# Patient Record
Sex: MALE | Race: WHITE | URBAN - METROPOLITAN AREA
[De-identification: names, ages, dates, MRNs, and addresses within clinical notes are randomized per-mention and may not be internally consistent; named-entity substitution may affect disease eponyms.]

---

## 2019-10-04 ENCOUNTER — APPOINTMENT (RX ONLY)
Dept: URBAN - METROPOLITAN AREA CLINIC 168 | Facility: CLINIC | Age: 54
Setting detail: DERMATOLOGY
End: 2019-10-04

## 2019-10-04 DIAGNOSIS — L40.0 PSORIASIS VULGARIS: ICD-10-CM

## 2019-10-04 PROCEDURE — ? ADDITIONAL NOTES

## 2019-10-04 PROCEDURE — ? PRESCRIPTION

## 2019-10-04 PROCEDURE — ? COUNSELING

## 2019-10-04 PROCEDURE — 99213 OFFICE O/P EST LOW 20 MIN: CPT

## 2019-10-04 RX ORDER — BETAMETHASONE DIPROPIONATE 0.5 MG/G
CREAM TOPICAL
Qty: 1 | Refills: 3 | Status: ERX | COMMUNITY
Start: 2019-10-04

## 2019-10-04 RX ORDER — TRIAMCINOLONE ACETONIDE 1 MG/G
CREAM TOPICAL
Qty: 1 | Refills: 2 | Status: ERX | COMMUNITY
Start: 2019-10-04

## 2019-10-04 RX ADMIN — BETAMETHASONE DIPROPIONATE: 0.5 CREAM TOPICAL at 00:00

## 2019-10-04 RX ADMIN — TRIAMCINOLONE ACETONIDE: 1 CREAM TOPICAL at 00:00

## 2019-10-04 ASSESSMENT — LOCATION SIMPLE DESCRIPTION DERM
LOCATION SIMPLE: LEFT INDEX FINGER
LOCATION SIMPLE: RIGHT POSTERIOR THIGH
LOCATION SIMPLE: RIGHT INDEX FINGER
LOCATION SIMPLE: RIGHT LOWER BACK
LOCATION SIMPLE: LEFT POSTERIOR THIGH

## 2019-10-04 ASSESSMENT — LOCATION ZONE DERM
LOCATION ZONE: FINGERNAIL
LOCATION ZONE: TRUNK
LOCATION ZONE: LEG

## 2019-10-04 ASSESSMENT — LOCATION DETAILED DESCRIPTION DERM
LOCATION DETAILED: RIGHT INDEX FINGERNAIL
LOCATION DETAILED: RIGHT DISTAL POSTERIOR THIGH
LOCATION DETAILED: LEFT INDEX FINGERNAIL
LOCATION DETAILED: RIGHT INFERIOR MEDIAL MIDBACK
LOCATION DETAILED: LEFT PROXIMAL POSTERIOR THIGH

## 2019-10-04 NOTE — HPI: RASH (PSORIASIS)
How Severe Is Your Psoriasis?: moderate
Is This A New Presentation, Or A Follow-Up?: Follow Up Psoriasis
Additional History: Pt adds that he has been spending time in [place] which is significantly less stressful and may have contributed to his  improvement

## 2019-10-04 NOTE — PROCEDURE: ADDITIONAL NOTES
Detail Level: Simple
Additional Notes: ***\\n-well controlled on topical treatments \\n-nails involved \\n-Rx refill for triamcinolone ointment sent today \\n-Rx refill for betamethasone ointment \\n\\nPatient’s daughter was used as an  over the phone

## 2019-10-07 ENCOUNTER — RX ONLY (OUTPATIENT)
Age: 54
Setting detail: RX ONLY
End: 2019-10-07

## 2019-10-07 RX ORDER — FLUOCINONIDE 0.5 MG/G
CREAM TOPICAL
Qty: 60 | Refills: 2 | Status: ERX | COMMUNITY
Start: 2019-10-07

## 2019-10-25 RX ORDER — TRIAMCINOLONE ACETONIDE 1 MG/G
CREAM TOPICAL
Qty: 1 | Refills: 2 | Status: ERX

## 2020-06-10 ENCOUNTER — APPOINTMENT (RX ONLY)
Dept: URBAN - METROPOLITAN AREA CLINIC 168 | Facility: CLINIC | Age: 55
Setting detail: DERMATOLOGY
End: 2020-06-10

## 2020-06-10 DIAGNOSIS — L40.0 PSORIASIS VULGARIS: ICD-10-CM

## 2020-06-10 PROCEDURE — ? PRESCRIPTION

## 2020-06-10 PROCEDURE — ? COUNSELING

## 2020-06-10 PROCEDURE — ? ADDITIONAL NOTES

## 2020-06-10 PROCEDURE — 99212 OFFICE O/P EST SF 10 MIN: CPT | Mod: 95

## 2020-06-10 RX ORDER — TRIAMCINOLONE ACETONIDE 1 MG/G
1 CREAM TOPICAL BID
Qty: 1 | Refills: 3 | Status: ERX

## 2020-06-10 RX ORDER — BETAMETHASONE DIPROPIONATE 0.5 MG/G
1 CREAM TOPICAL BID
Qty: 2 | Refills: 2 | Status: ERX | COMMUNITY
Start: 2020-06-10

## 2020-06-10 RX ADMIN — BETAMETHASONE DIPROPIONATE 1: 0.5 CREAM TOPICAL at 00:00

## 2020-06-10 ASSESSMENT — LOCATION SIMPLE DESCRIPTION DERM
LOCATION SIMPLE: LEFT INDEX FINGER
LOCATION SIMPLE: RIGHT LOWER BACK
LOCATION SIMPLE: RIGHT INDEX FINGER
LOCATION SIMPLE: RIGHT UPPER BACK

## 2020-06-10 ASSESSMENT — LOCATION DETAILED DESCRIPTION DERM
LOCATION DETAILED: RIGHT INFERIOR MEDIAL MIDBACK
LOCATION DETAILED: LEFT INDEX FINGERNAIL
LOCATION DETAILED: RIGHT INDEX FINGERNAIL
LOCATION DETAILED: RIGHT SUPERIOR UPPER BACK

## 2020-06-10 ASSESSMENT — LOCATION ZONE DERM
LOCATION ZONE: TRUNK
LOCATION ZONE: TRUNK
LOCATION ZONE: FINGERNAIL

## 2020-06-10 NOTE — PROCEDURE: ADDITIONAL NOTES
Detail Level: Simple
Additional Notes: *** patient was informed that if the psoriasis covers an area of the body more than 5 palms to call and schedule a visit for a reassessment. \\n\\n-well controlled on topical treatments \\n-nails involved \\n-Rx refill for triamcinolone ointment sent today \\n-Rx refill for betamethasone ointment \\n\\nPemily’s Son was used as an  over the phone

## 2020-06-16 ENCOUNTER — RX ONLY (OUTPATIENT)
Age: 55
Setting detail: RX ONLY
End: 2020-06-16

## 2020-06-16 RX ORDER — CLOBETASOL PROPIONATE 0.5 MG/G
CREAM TOPICAL
Qty: 1 | Refills: 2 | Status: ERX | COMMUNITY
Start: 2020-06-16

## 2021-03-08 ENCOUNTER — RX ONLY (OUTPATIENT)
Age: 56
Setting detail: RX ONLY
End: 2021-03-08

## 2021-03-08 RX ORDER — CLOBETASOL PROPIONATE 0.5 MG/G
CREAM TOPICAL
Qty: 1 | Refills: 3 | Status: ERX | COMMUNITY
Start: 2021-03-08

## 2021-04-16 ENCOUNTER — APPOINTMENT (RX ONLY)
Dept: URBAN - METROPOLITAN AREA CLINIC 168 | Facility: CLINIC | Age: 56
Setting detail: DERMATOLOGY
End: 2021-04-16

## 2021-04-16 VITALS — TEMPERATURE: 97.8 F

## 2021-04-16 DIAGNOSIS — L40.0 PSORIASIS VULGARIS: ICD-10-CM | Status: IMPROVED

## 2021-04-16 PROCEDURE — 99213 OFFICE O/P EST LOW 20 MIN: CPT

## 2021-04-16 PROCEDURE — ? PRESCRIPTION

## 2021-04-16 PROCEDURE — ? ADDITIONAL NOTES

## 2021-04-16 PROCEDURE — ? COUNSELING

## 2021-04-16 RX ORDER — TRIAMCINOLONE ACETONIDE 1 MG/G
CREAM TOPICAL BID
Qty: 1 | Refills: 0 | Status: ERX

## 2021-04-16 RX ORDER — FLUOCINONIDE 0.5 MG/ML
SOLUTION TOPICAL AS DIRECTED
Qty: 1 | Refills: 0 | Status: ERX | COMMUNITY
Start: 2021-04-16

## 2021-04-16 RX ADMIN — FLUOCINONIDE: 0.5 SOLUTION TOPICAL at 00:00

## 2021-04-16 ASSESSMENT — LOCATION ZONE DERM
LOCATION ZONE: TRUNK
LOCATION ZONE: TRUNK
LOCATION ZONE: FINGERNAIL
LOCATION ZONE: SCALP

## 2021-04-16 ASSESSMENT — LOCATION SIMPLE DESCRIPTION DERM
LOCATION SIMPLE: LEFT INDEX FINGER
LOCATION SIMPLE: RIGHT LOWER BACK
LOCATION SIMPLE: RIGHT INDEX FINGER
LOCATION SIMPLE: POSTERIOR SCALP
LOCATION SIMPLE: RIGHT UPPER BACK

## 2021-04-16 ASSESSMENT — LOCATION DETAILED DESCRIPTION DERM
LOCATION DETAILED: RIGHT OCCIPITAL SCALP
LOCATION DETAILED: RIGHT SUPERIOR UPPER BACK
LOCATION DETAILED: RIGHT INDEX FINGERNAIL
LOCATION DETAILED: RIGHT INFERIOR MEDIAL MIDBACK
LOCATION DETAILED: LEFT INDEX FINGERNAIL

## 2021-04-16 ASSESSMENT — PGA PSORIASIS: PGA PSORIASIS 2020: MILD

## 2021-04-16 NOTE — PROCEDURE: ADDITIONAL NOTES
Detail Level: Simple
Additional Notes: **Improved\\n\\n- Instructions for topical corticosteroid use explained to patient and his daughter again today.\\n- Declined refill for Clobetsol for now as his psoriasis has significantly improved.\\n- Refill for Triamcinolone jar sent to his pharmacy of choice.\\n- Advised to apply to psoriasis lesions twice daily for one week, hold one week, repeat if needed.\\n- Rx Fluocinonide solution sent to apply to scalp 1-2 times daily as needed.\\n- Both Rxs sent with no refills, but patient may call office (or pharmacy request) when refills are needed. This is intended to minimize overuse and monitor how much medication he is utilizing. Daughter was in agreement and will monitor closely as well.\\n\\n-Well controlled on topical treatments \\n-Nails improving, will continue to monitor\\n\\nPatient’s Daughter was used as an  over the phone\\n\\nReturn in 6 months for follow up, sooner if needed.

## 2021-04-16 NOTE — HPI: OTHER
Condition:: Psoriasis follow up
Please Describe Your Condition:: Improved. Continues to apply Triamcinolone daily.

## 2021-10-08 ENCOUNTER — RX ONLY (OUTPATIENT)
Age: 56
Setting detail: RX ONLY
End: 2021-10-08

## 2021-10-08 ENCOUNTER — APPOINTMENT (RX ONLY)
Dept: URBAN - METROPOLITAN AREA CLINIC 168 | Facility: CLINIC | Age: 56
Setting detail: DERMATOLOGY
End: 2021-10-08

## 2021-10-08 DIAGNOSIS — L40.0 PSORIASIS VULGARIS: ICD-10-CM

## 2021-10-08 PROCEDURE — 99213 OFFICE O/P EST LOW 20 MIN: CPT

## 2021-10-08 PROCEDURE — ? PRESCRIPTION

## 2021-10-08 PROCEDURE — ? ADDITIONAL NOTES

## 2021-10-08 PROCEDURE — ? COUNSELING

## 2021-10-08 RX ORDER — FLUOCINONIDE 0.5 MG/ML
SOLUTION TOPICAL
Qty: 60 | Refills: 5 | Status: CANCELLED | COMMUNITY
Start: 2021-10-08

## 2021-10-08 RX ORDER — FLUOCINONIDE 0.5 MG/ML
SOLUTION TOPICAL
Qty: 60 | Refills: 2 | Status: ERX | COMMUNITY
Start: 2021-10-08

## 2021-10-08 RX ORDER — CLOBETASOL PROPIONATE 0.5 MG/G
CREAM TOPICAL
Qty: 60 | Refills: 5 | Status: CANCELLED | COMMUNITY
Start: 2021-10-08

## 2021-10-08 RX ORDER — CLOBETASOL PROPIONATE 0.5 MG/G
CREAM TOPICAL
Qty: 60 | Refills: 2 | Status: ERX | COMMUNITY
Start: 2021-10-08

## 2021-10-08 RX ADMIN — CLOBETASOL PROPIONATE: 0.5 CREAM TOPICAL at 00:00

## 2021-10-08 RX ADMIN — FLUOCINONIDE: 0.5 SOLUTION TOPICAL at 00:00

## 2021-10-08 ASSESSMENT — LOCATION SIMPLE DESCRIPTION DERM
LOCATION SIMPLE: RIGHT INDEX FINGER
LOCATION SIMPLE: RIGHT LOWER BACK
LOCATION SIMPLE: LEFT INDEX FINGER
LOCATION SIMPLE: POSTERIOR SCALP

## 2021-10-08 ASSESSMENT — LOCATION ZONE DERM
LOCATION ZONE: SCALP
LOCATION ZONE: FINGERNAIL
LOCATION ZONE: TRUNK

## 2021-10-08 ASSESSMENT — LOCATION DETAILED DESCRIPTION DERM
LOCATION DETAILED: RIGHT OCCIPITAL SCALP
LOCATION DETAILED: RIGHT INDEX FINGERNAIL
LOCATION DETAILED: RIGHT INFERIOR MEDIAL MIDBACK
LOCATION DETAILED: LEFT INDEX FINGERNAIL

## 2021-10-08 NOTE — PROCEDURE: MIPS QUALITY
Quality 226: Preventive Care And Screening: Tobacco Use: Screening And Cessation Intervention: Patient screened for tobacco use and is an ex/non-smoker
Quality 130: Documentation Of Current Medications In The Medical Record: Current Medications Documented
Detail Level: Detailed
Quality 110: Preventive Care And Screening: Influenza Immunization: Influenza Immunization not Administered because Patient Refused.
Quality 431: Preventive Care And Screening: Unhealthy Alcohol Use - Screening: Patient not identified as an unhealthy alcohol user when screened for unhealthy alcohol use using a systematic screening method

## 2021-10-08 NOTE — PROCEDURE: ADDITIONAL NOTES
Detail Level: Simple
Additional Notes: **Improved\\n-Well controlled on topical treatments \\n-Nails improving, will continue to monitor\\n\\nPatient’s son was used as an  over the phone.\\n\\nReturn in 6 months for follow up, sooner if needed.

## 2022-08-11 ENCOUNTER — APPOINTMENT (RX ONLY)
Dept: URBAN - METROPOLITAN AREA CLINIC 168 | Facility: CLINIC | Age: 57
Setting detail: DERMATOLOGY
End: 2022-08-11

## 2022-08-11 DIAGNOSIS — L40.0 PSORIASIS VULGARIS: ICD-10-CM

## 2022-08-11 PROCEDURE — ? ORDER TESTS

## 2022-08-11 PROCEDURE — ? PRESCRIPTION

## 2022-08-11 PROCEDURE — ? ADDITIONAL NOTES

## 2022-08-11 PROCEDURE — ? COUNSELING

## 2022-08-11 PROCEDURE — 99214 OFFICE O/P EST MOD 30 MIN: CPT

## 2022-08-11 ASSESSMENT — LOCATION SIMPLE DESCRIPTION DERM
LOCATION SIMPLE: RIGHT INDEX FINGER
LOCATION SIMPLE: POSTERIOR SCALP
LOCATION SIMPLE: RIGHT LOWER BACK
LOCATION SIMPLE: LEFT INDEX FINGER

## 2022-08-11 ASSESSMENT — LOCATION DETAILED DESCRIPTION DERM
LOCATION DETAILED: RIGHT INFERIOR MEDIAL MIDBACK
LOCATION DETAILED: RIGHT INDEX FINGERNAIL
LOCATION DETAILED: RIGHT OCCIPITAL SCALP
LOCATION DETAILED: LEFT INDEX FINGERNAIL

## 2022-08-11 ASSESSMENT — LOCATION ZONE DERM
LOCATION ZONE: TRUNK
LOCATION ZONE: FINGERNAIL
LOCATION ZONE: SCALP

## 2022-08-11 NOTE — PROCEDURE: ORDER TESTS
Lab Facility: [id number]
Performing Laboratory: [id number]
Bill For Surgical Tray: no
Expected Date Of Service: 08/11/2022
Billing Type: Third-Party Bill

## 2022-08-11 NOTE — HPI: OTHER
Condition:: Psoriasis f/u
Please Describe Your Condition:: is an established patient who is being seen for a chief complaint of Psoriasis f/u . \\n\\nLOV 10/8/2021: \\n\\n***Improved***\\n-Well controlled on topical treatments\\n-Nails improving, will continue to monitor\\nPatient’s son was used as an  over the phone.\\nReturn in 6 months for follow up, sooner if needed.

## 2022-08-11 NOTE — PROCEDURE: ADDITIONAL NOTES
Detail Level: Simple
Additional Notes: ***Stable*** \\n***Patient’s son was used as an  over the phone***\\n\\n- Patient has been doing okay on topicals, only uses clobetasol \\n- Active areas include: hands, feet, scalp, knees, elbow and nails \\n- Reports no joint pain \\n- He has tried and failed bethmethasone and fluocinonide \\n- As of right now, he is only using clobetasol as it is the only medication that is working for patient. \\n- He uses about 60 grams of clobetasol every month which is a substantial amount of steroid, advise to start lessening application. \\n- Suggested to use Zyrtec and Benadryl for itch \\n- Will send new clobetasol rx for the interim until f/u in 1 month to further discuss treatment options\\n- Lab order form given to complete if biological medication is desired during next visit

## 2022-08-12 RX ORDER — CLOBETASOL PROPIONATE 0.5 MG/G
CREAM TOPICAL
Qty: 45 | Refills: 3 | Status: ERX

## 2024-02-05 ENCOUNTER — APPOINTMENT (RX ONLY)
Dept: URBAN - METROPOLITAN AREA CLINIC 168 | Facility: CLINIC | Age: 59
Setting detail: DERMATOLOGY
End: 2024-02-05

## 2024-02-05 DIAGNOSIS — L30.9 DERMATITIS, UNSPECIFIED: ICD-10-CM

## 2024-02-05 DIAGNOSIS — L40.0 PSORIASIS VULGARIS: ICD-10-CM

## 2024-02-05 DIAGNOSIS — L82.1 OTHER SEBORRHEIC KERATOSIS: ICD-10-CM

## 2024-02-05 PROCEDURE — ? COUNSELING

## 2024-02-05 PROCEDURE — ? KOH PREP

## 2024-02-05 PROCEDURE — ? ADDITIONAL NOTES

## 2024-02-05 PROCEDURE — 99213 OFFICE O/P EST LOW 20 MIN: CPT

## 2024-02-05 PROCEDURE — ? PRESCRIPTION

## 2024-02-05 RX ORDER — CLOBETASOL PROPIONATE 0.5 MG/G
CREAM TOPICAL
Qty: 45 | Refills: 3 | Status: ERX

## 2024-02-05 ASSESSMENT — LOCATION DETAILED DESCRIPTION DERM
LOCATION DETAILED: 4TH WEBSPACE LEFT FOOT
LOCATION DETAILED: RIGHT OCCIPITAL SCALP
LOCATION DETAILED: LEFT CENTRAL TEMPLE
LOCATION DETAILED: RIGHT BUTTOCK
LOCATION DETAILED: RIGHT PROXIMAL LATERAL POSTERIOR THIGH
LOCATION DETAILED: LEFT DORSAL FOOT

## 2024-02-05 ASSESSMENT — LOCATION SIMPLE DESCRIPTION DERM
LOCATION SIMPLE: RIGHT BUTTOCK
LOCATION SIMPLE: LEFT TEMPLE
LOCATION SIMPLE: RIGHT POSTERIOR THIGH
LOCATION SIMPLE: LEFT FOOT
LOCATION SIMPLE: POSTERIOR SCALP

## 2024-02-05 ASSESSMENT — LOCATION ZONE DERM
LOCATION ZONE: FEET
LOCATION ZONE: SCALP
LOCATION ZONE: TRUNK
LOCATION ZONE: FACE
LOCATION ZONE: LEG

## 2024-02-05 NOTE — PROCEDURE: ADDITIONAL NOTES
Detail Level: Simple
Additional Notes: ***Well Controll*** \\n***Patient’s daughter, Jeanine, was used as an  over the phone***\\n\\n-Less area of disease involvement today \\n-Focal patches on right buttock, right posterior thigh, feet \\n\\n- He has tried and failed bethmethasone and fluocinonide\\n\\n-Patient may continue with Clobetasol 2-3x weekly as needed for flare up.

## 2024-09-12 ENCOUNTER — APPOINTMENT (RX ONLY)
Dept: URBAN - METROPOLITAN AREA CLINIC 168 | Facility: CLINIC | Age: 59
Setting detail: DERMATOLOGY
End: 2024-09-12

## 2024-09-12 DIAGNOSIS — L21.8 OTHER SEBORRHEIC DERMATITIS: ICD-10-CM

## 2024-09-12 DIAGNOSIS — B35.3 TINEA PEDIS: ICD-10-CM

## 2024-09-12 DIAGNOSIS — L30.0 NUMMULAR DERMATITIS: ICD-10-CM

## 2024-09-12 PROCEDURE — ? COUNSELING

## 2024-09-12 PROCEDURE — ? ADDITIONAL NOTES

## 2024-09-12 PROCEDURE — ? TREATMENT REGIMEN

## 2024-09-12 PROCEDURE — ? PRESCRIPTION

## 2024-09-12 PROCEDURE — 99214 OFFICE O/P EST MOD 30 MIN: CPT

## 2024-09-12 RX ORDER — KETOCONAZOLE 20 MG/G
CREAM TOPICAL BID
Qty: 60 | Refills: 3 | Status: ERX | COMMUNITY
Start: 2024-09-12

## 2024-09-12 RX ORDER — KETOCONAZOLE 20 MG/ML
SHAMPOO, SUSPENSION TOPICAL AS DIRECTED
Qty: 120 | Refills: 5 | Status: ERX | COMMUNITY
Start: 2024-09-12

## 2024-09-12 RX ORDER — CLOBETASOL PROPIONATE 0.5 MG/ML
SOLUTION TOPICAL QD
Qty: 50 | Refills: 3 | Status: ERX | COMMUNITY
Start: 2024-09-12

## 2024-09-12 RX ADMIN — CLOBETASOL PROPIONATE: 0.5 SOLUTION TOPICAL at 00:00

## 2024-09-12 RX ADMIN — KETOCONAZOLE: 20 CREAM TOPICAL at 00:00

## 2024-09-12 RX ADMIN — KETOCONAZOLE: 20 SHAMPOO, SUSPENSION TOPICAL at 00:00

## 2024-09-12 ASSESSMENT — LOCATION DETAILED DESCRIPTION DERM: LOCATION DETAILED: LEFT DORSAL FOOT

## 2024-09-12 ASSESSMENT — ITCH NUMERIC RATING SCALE: HOW SEVERE IS YOUR ITCHING?: 6

## 2024-09-12 ASSESSMENT — SEVERITY ASSESSMENT
HOW SEVERE IS THIS PATIENT'S CONDITION?: MILD
SEVERITY: MODERATE

## 2024-09-12 ASSESSMENT — BSA RASH: BSA RASH: 8

## 2024-09-12 ASSESSMENT — LOCATION SIMPLE DESCRIPTION DERM: LOCATION SIMPLE: LEFT FOOT

## 2024-09-12 ASSESSMENT — LOCATION ZONE DERM: LOCATION ZONE: FEET

## 2024-09-12 NOTE — PROCEDURE: TREATMENT REGIMEN
Detail Level: Zone
Initiate Treatment: ketoconazole 2 % topical cream- Apply to affected areas on feet twice a day x 3 weeks.
Initiate Treatment: ketoconazole 2 % shampoo- Lather shampoo on scalp and face, let sit for 3-5 minutes, then rinse. Do this 2-3x a week.\\nclobetasol 0.05 % scalp solution- Apply once daily to areas on scalp for 2 weeks, then 2-3 times a weeks as needed

## 2024-09-12 NOTE — PROCEDURE: ADDITIONAL NOTES
Additional Notes: Patient was using clobetasol on spot for 3 months but did not get a response.
Render Risk Assessment In Note?: no
Detail Level: Simple

## 2024-09-12 NOTE — HPI: RASH
What Type Of Note Output Would You Prefer (Optional)?: Standard Output
How Severe Is Your Rash?: mild
Is This A New Presentation, Or A Follow-Up?: Rash
Additional History: Clobetasol has not helped.

## 2024-09-13 RX ORDER — CLOBETASOL PROPIONATE 0.5 MG/G
CREAM TOPICAL BID
Qty: 60 | Refills: 2 | Status: ERX

## 2025-04-04 ENCOUNTER — RX ONLY (RX ONLY)
Age: 60
End: 2025-04-04

## 2025-04-04 RX ORDER — CLOBETASOL PROPIONATE 0.5 MG/G
CREAM TOPICAL BID
Qty: 60 | Refills: 0 | Status: ERX